# Patient Record
Sex: MALE | Race: WHITE
[De-identification: names, ages, dates, MRNs, and addresses within clinical notes are randomized per-mention and may not be internally consistent; named-entity substitution may affect disease eponyms.]

---

## 2018-01-22 ENCOUNTER — HOSPITAL ENCOUNTER (OUTPATIENT)
Dept: HOSPITAL 45 - X.SURG | Age: 83
Discharge: HOME | End: 2018-01-22
Attending: OPHTHALMOLOGY
Payer: COMMERCIAL

## 2018-01-22 VITALS — HEART RATE: 65 BPM | SYSTOLIC BLOOD PRESSURE: 130 MMHG | OXYGEN SATURATION: 97 % | DIASTOLIC BLOOD PRESSURE: 70 MMHG

## 2018-01-22 VITALS — TEMPERATURE: 97.52 F

## 2018-01-22 VITALS
WEIGHT: 185.19 LBS | BODY MASS INDEX: 27.43 KG/M2 | HEIGHT: 69.02 IN | HEIGHT: 69.02 IN | WEIGHT: 185.19 LBS | BODY MASS INDEX: 27.43 KG/M2

## 2018-01-22 DIAGNOSIS — J44.9: ICD-10-CM

## 2018-01-22 DIAGNOSIS — Z83.3: ICD-10-CM

## 2018-01-22 DIAGNOSIS — Z87.891: ICD-10-CM

## 2018-01-22 DIAGNOSIS — I10: ICD-10-CM

## 2018-01-22 DIAGNOSIS — H43.11: Primary | ICD-10-CM

## 2018-01-22 DIAGNOSIS — Z82.3: ICD-10-CM

## 2018-01-22 DIAGNOSIS — Z90.89: ICD-10-CM

## 2018-01-22 DIAGNOSIS — Z82.49: ICD-10-CM

## 2018-01-22 DIAGNOSIS — Z86.73: ICD-10-CM

## 2018-01-22 NOTE — ANESTHESIA PROGRESS NT - [facility]
Anesthesia Post Op Note


Date & Time


Jan 22, 2018 at 09:16





Vital Signs


Pain Intensity:  0





Vital Signs Past 12 Hours








  Date Time  Temp Pulse Resp B/P (MAP) Pulse Ox O2 Delivery O2 Flow Rate FiO2


 


1/22/18 09:11 36.4 67 16 128/70 (89) 97 Room Air  


 


1/22/18 07:08 36.9 71 18 162/87 (112) 96 Room Air  











Notes


Mental Status:  alert / awake / arousable, participated in evaluation


Pt Amnestic to Procedure:  Yes


Nausea / Vomiting:  adequately controlled


Pain:  adequately controlled


Airway Patency, RR, SpO2:  stable & adequate


BP & HR:  stable & adequate


Hydration State:  stable & adequate


Anesthetic Complications:  no major complications apparent

## 2018-01-22 NOTE — DISCHARGE INSTRUCTIONS-[facility]
Discharge Instructions


Date of Service


Jan 22, 2018.





Visit


Reason for Visit:  Right Eye Vitreous Hemorrhage





Discharge


Discharge Diagnosis / Problem:  same with retinal tear





Discharge Goals


Goal(s):  Improve function





Medications


Stopped Medications Name(s):  


No aspirin since thursday 1-18-18





Activity Recommendations


Activity Limitations:  per Instructions/Follow-up section





Anesthesia


.





Post Anesthesia Instructions:





If you have had General Anesthesia or IV Sedation:





*  Do not drive today.


*  Resume driving when surgeon permits.


*  Do not make important decisions or sign legal documents today.


*  Call surgeon for:





   1.  Temperature elevations greater than 101 degrees F.


   2.  Uncontrollable pain.


   3.  Excessive bleeding.


   4.  Persistent nausea and vomiting.


   5.  Medication intolerance (nausea, vomiting or rash).





*  For nausea and vomiting use only clear liquids such as: tea, soda, bouillon 

until nausea subsides, then gradually increase diet as tolerated.





*  If you have any concerns or questions, call your surgeon's office.  If 

physician is unavailable and it is an emergency, call 911 or go to the nearest 

emergency room.





.





Instructions / Follow-Up


Instructions / Follow-Up


*  May take Tylenol if needed for discomfort.





*  Do NOT remove eye shield.





*  NO straining, heavy lifting (>15 pounds) or bending below waist.





*  Avoid getting water or soap directly into operative eye.





*  Do NOT rub eye.





If you experience increasing eye pain not relieved by medication, please 

contact us immediately at [phone].





If you are unable to reach someone at the above number, call [phone] and 

ask to speak with the EYE DOCTOR ON CALL.  Inform them that you are a Dr. Ruby patient who had recent surgery.





Diet Recommendations


Home Diet:  resume previous diet





Procedures


Procedures Performed:  


Right Eye 23 Gauge Vitrectomy





Pending Studies


Studies pending at discharge:  no





Medical Emergencies








.


Who to Call and When:





Medical Emergencies:  If at any time you feel your situation is an emergency, 

please call 911 immediately.





.





Non-Emergent Contact


Non-Emergency issues call your:  Ophthalmologist





.


.








"Provider Documentation" section prepared by Dawson Ruby.








.

## 2018-01-22 NOTE — [facility] OPERATIVE REPORT
Operative Report


Date of Service


Jan 22, 2018.





Operative Report


PREOPERATIVE DIAGNOSIS: Vitreous hemorrhage, right eye.





ICD10 CODE: H43.11 





POSTOPERATIVE DIAGNOSIS: same and retinal tear right eye





PROCEDURE:  1. Pars plana vitrectomy,  23 gauge.  2. Endolaser. All to the 

right eye.





CPT CODE: 95864





SURGEON: Dawson Ruby D.O. 





COMPLICATIONS: None. 





ESTIMATED BLOOD LOSS: None.





SPECIMENS: None.





ANESTHESIA: Retrobulbar block and MAC.





INDICATIONS FOR PROCEDURE: Surgery is indicated to decrease risk of vision loss 

and potentially improve vision.





CONSENT: 


The risks, benefits and alternatives were discussed with the patient including 

but not limited to decreased visual acuity, failure to achieve desired results, 

loss of the eye, infection, pain, glaucoma, lens changes, retinal tears, 

retinal detachment, the need for more procedures, drooping of the eyelid, 

blindness, and double vision. The patient is aware of risks and consents to the 

surgery. Consent is signed and on the chart.





OPERATION AND FINDINGS:  


The patient was brought to the operating room where the patient was identified 

by name, birth date, and medical record number. The surgical site was confirmed 

with the informed written consent. The patient was sedated by the 

anesthesiology team after which a 50:50 mixture of 2% lidocaine and 0.75% 

bupivacaine with hyaluronidase was administered in a standard retrobulbar 

fashion. A total of 4 ml was administered without difficulty. The patient was 

then prepped and draped in the usual sterile manner for retinal surgery. 


A wire lid speculum was placed and an Anjel 23-gauge trocar cannula system was 

employed.  The inferior temporal trocar cannula was first placed in an angled 

fashion 3.75mm posterior to the surgical limbus and the infusion cannula was 

inserted into this cannula after which the intravitreal position was verified 

prior to turning the infusion on. Two more trocar cannulas were then inserted 

in an angled fashion, one in the superior temporal, and one in the superior 

nasal quadrant both  3.75mm posterior to the surgical limbus.  A light pipe and 

vitrector were then introduced into the eye and the BIOM wide angle viewing 

system was brought into place. 


Posterior inspection revealed a dense vitreous hemorrhage.  Standard core 

vitrectomy was performed and once this was cleared a medium sized retinal tear 

was found superior temporally. An endolaser instrument was used to place laser 

barrier around the retinal tear.  The vitreous was insured to be totally 

detached from the posterior pole with the aid of the vitrector. 





At this point scleral depression was performed for 360 degrees and no other 

retinal tears and no detachments were noted. The trocar cannulas were then 

removed and found to be water tight. The intraocular pressure was found to be 

within normal limits by palpation and subconjunctival injections of Kefzol and 

dexamethasone were administered inferiorly and superiorly. The wire lid 

speculum was removed. Maxitrol and timolol were applied to the surface of the 

eye. A light patch and shield were taped over the surface of the eye and the 

patient left the Operating Room in stable condition having tolerated the 

procedure well. 





DISPOSITION: 


The patient has an appointment the following morning in the Ophthalmology 

Clinic. The patient is to call immediately if there are any problems overnight.


I attest to the content of the Intraoperative Record and any orders documented 

therein.  Any exceptions are noted below.

## 2018-01-22 NOTE — HISTORY & PHYSICAL BRIDGE - [facility]
H&P Re-Evaluation


Bridge Note:


Pt has vitreous hemorrhage right eye and is here for vitrectomy right eye. I 

have examined the patient, reviewed the History & Physical and in the interval 

since the performance of the History & Physical I have noted the following 

changes of clinical significance: No changes noted

## 2021-01-13 DIAGNOSIS — Z23 NEED FOR VACCINATION: ICD-10-CM
